# Patient Record
Sex: FEMALE | Race: BLACK OR AFRICAN AMERICAN
[De-identification: names, ages, dates, MRNs, and addresses within clinical notes are randomized per-mention and may not be internally consistent; named-entity substitution may affect disease eponyms.]

---

## 2019-08-30 ENCOUNTER — HOSPITAL ENCOUNTER (EMERGENCY)
Dept: HOSPITAL 35 - ER | Age: 24
Discharge: HOME | End: 2019-08-30
Payer: COMMERCIAL

## 2019-08-30 VITALS — SYSTOLIC BLOOD PRESSURE: 118 MMHG | DIASTOLIC BLOOD PRESSURE: 70 MMHG

## 2019-08-30 VITALS — HEIGHT: 64 IN | WEIGHT: 250 LBS | BODY MASS INDEX: 42.68 KG/M2

## 2019-08-30 DIAGNOSIS — J06.9: Primary | ICD-10-CM

## 2019-09-03 NOTE — EKG
[place] [address] [address] [facility]
[place]  [address]
Phone:  [phone]                    ELECTROCARDIOGRAM REPORT      
_______________________________________________________________________________
 
Name:       MEEK GONZALEZ              Room #:                     [facility]#:      [id number]     Account #:      [id number]  
Admission:  19    Attend Phys:                          
Discharge:  19    Date of Birth:  [birth year]  
                                                          Report #: [id number]
   [id number]
_______________________________________________________________________________
THIS REPORT FOR:   //name//                          
 
                         [facility] ED
                                       
Test Date:    2019               Test Time:    17:30:26
Pat Name:     MEEK GONZALEZ           Department:   
Patient ID:   [id number]            Room:          
Gender:       F                        Technician:   LIZETTE
:          1995               Requested By: Jair Loyd
Order Number: [id number] MD:   Tj Garcia
                                 Measurements
Intervals                              Axis          
Rate:         105                      P:            51
PA:           138                      QRS:          33
QRSD:         86                       T:            -82
QT:           378                                    
QTc:          500                                    
                           Interpretive Statements
Sinus tachycardia
Nonspecific ST and T wave abnormality
Prolonged QT interval
Compared to ECG 2016 18:13:42
Premature ventricular complexes are now present
Electronically Signed On 9-3-2019 7:46:23 CDT by Tj Garcia
[email or web address]
 
 
 
 
 
 
 
 
 
 
 
 
 
 
 
 
 
 
  <ELECTRONICALLY SIGNED>
   By: Tj Garcia MD, [facility]   
  19     0746
D: 19                           _____________________________________
T: 19                           Tj Garcia MD, [facility]     /EPI